# Patient Record
Sex: FEMALE | Race: WHITE
[De-identification: names, ages, dates, MRNs, and addresses within clinical notes are randomized per-mention and may not be internally consistent; named-entity substitution may affect disease eponyms.]

---

## 2018-05-04 ENCOUNTER — RESULT REVIEW (OUTPATIENT)
Age: 53
End: 2018-05-04

## 2019-04-19 ENCOUNTER — RX RENEWAL (OUTPATIENT)
Age: 54
End: 2019-04-19

## 2019-04-19 PROBLEM — Z00.00 ENCOUNTER FOR PREVENTIVE HEALTH EXAMINATION: Status: ACTIVE | Noted: 2019-04-19

## 2019-05-03 ENCOUNTER — RECORD ABSTRACTING (OUTPATIENT)
Age: 54
End: 2019-05-03

## 2019-05-03 DIAGNOSIS — Z78.9 OTHER SPECIFIED HEALTH STATUS: ICD-10-CM

## 2019-05-03 DIAGNOSIS — K42.0 UMBILICAL HERNIA WITH OBSTRUCTION, W/OUT GANGRENE: ICD-10-CM

## 2019-05-03 DIAGNOSIS — Z86.19 PERSONAL HISTORY OF OTHER INFECTIOUS AND PARASITIC DISEASES: ICD-10-CM

## 2019-05-03 DIAGNOSIS — R23.2 FLUSHING: ICD-10-CM

## 2019-05-03 DIAGNOSIS — I49.9 CARDIAC ARRHYTHMIA, UNSPECIFIED: ICD-10-CM

## 2019-05-03 DIAGNOSIS — N91.2 AMENORRHEA, UNSPECIFIED: ICD-10-CM

## 2019-05-03 DIAGNOSIS — Z82.49 FAMILY HISTORY OF ISCHEMIC HEART DISEASE AND OTHER DISEASES OF THE CIRCULATORY SYSTEM: ICD-10-CM

## 2019-05-03 LAB
CYTOLOGY CVX/VAG DOC THIN PREP: NORMAL
CYTOLOGY CVX/VAG DOC THIN PREP: NORMAL

## 2019-05-03 RX ORDER — MULTIVITAMIN
TABLET ORAL
Refills: 0 | Status: ACTIVE | COMMUNITY

## 2019-05-06 ENCOUNTER — APPOINTMENT (OUTPATIENT)
Dept: OBGYN | Facility: CLINIC | Age: 54
End: 2019-05-06
Payer: COMMERCIAL

## 2019-05-06 VITALS
SYSTOLIC BLOOD PRESSURE: 100 MMHG | BODY MASS INDEX: 24.72 KG/M2 | WEIGHT: 148.4 LBS | DIASTOLIC BLOOD PRESSURE: 60 MMHG | HEIGHT: 65 IN

## 2019-05-06 DIAGNOSIS — N89.8 OTHER SPECIFIED NONINFLAMMATORY DISORDERS OF VAGINA: ICD-10-CM

## 2019-05-06 DIAGNOSIS — N60.02 SOLITARY CYST OF LEFT BREAST: ICD-10-CM

## 2019-05-06 DIAGNOSIS — Z80.8 FAMILY HISTORY OF MALIGNANT NEOPLASM OF OTHER ORGANS OR SYSTEMS: ICD-10-CM

## 2019-05-06 PROCEDURE — 99396 PREV VISIT EST AGE 40-64: CPT

## 2019-05-06 PROCEDURE — 81003 URINALYSIS AUTO W/O SCOPE: CPT | Mod: QW

## 2019-05-06 PROCEDURE — 82270 OCCULT BLOOD FECES: CPT

## 2019-06-02 LAB
BILIRUB UR QL STRIP: NORMAL
CYTOLOGY CVX/VAG DOC THIN PREP: NORMAL
GLUCOSE UR-MCNC: NORMAL
HCG UR QL: 0.2 EU/DL
HGB UR QL STRIP.AUTO: NORMAL
HPV HIGH+LOW RISK DNA PNL CVX: NOT DETECTED
KETONES UR-MCNC: NORMAL
LEUKOCYTE ESTERASE UR QL STRIP: NORMAL
NITRITE UR QL STRIP: NORMAL
PH UR STRIP: 6
PROT UR STRIP-MCNC: NORMAL
SP GR UR STRIP: 1.01

## 2019-08-21 PROBLEM — N89.8 VAGINAL DRYNESS: Status: ACTIVE | Noted: 2019-05-03

## 2019-08-21 PROBLEM — Z80.8 FAMILY HISTORY OF MALIGNANT NEOPLASM OF SKIN: Status: ACTIVE | Noted: 2019-05-03

## 2019-08-21 NOTE — PHYSICAL EXAM
[Alert] : alert [Acute Distress] : no acute distress [Mass] : no breast mass [Nipple Discharge] : no nipple discharge [Axillary LAD] : no axillary lymphadenopathy [Soft] : soft [Tender] : non tender [Oriented x3] : oriented to person, place, and time [Depressed Mood] : not depressed [Labia Minora] : labia minora [Labia Majora] : labia major [Normal] : clitoris [No Bleeding] : there was no active vaginal bleeding [Uterine Adnexae] : were not tender and not enlarged [No Tenderness] : no rectal tenderness [Occult Blood] : occult blood test from digital rectal exam was negative

## 2019-08-21 NOTE — HISTORY OF PRESENT ILLNESS
[Last Mammogram ___] : Last Mammogram was [unfilled] [Last Pap ___] : Last cervical pap smear was [unfilled] [Definite:  ___ (Date)] : the last menstrual period was [unfilled] [Menarche Age: ____] : age at menarche was [unfilled] [Sexually Active] : is sexually active [Male ___] : [unfilled] male [de-identified] : Breast u/s 04/18/2018

## 2020-04-06 ENCOUNTER — RX RENEWAL (OUTPATIENT)
Age: 55
End: 2020-04-06

## 2020-04-07 ENCOUNTER — RX RENEWAL (OUTPATIENT)
Age: 55
End: 2020-04-07

## 2020-07-01 ENCOUNTER — RX RENEWAL (OUTPATIENT)
Age: 55
End: 2020-07-01

## 2020-10-05 ENCOUNTER — APPOINTMENT (OUTPATIENT)
Dept: OBGYN | Facility: CLINIC | Age: 55
End: 2020-10-05
Payer: COMMERCIAL

## 2020-10-05 VITALS
HEIGHT: 65 IN | WEIGHT: 148 LBS | DIASTOLIC BLOOD PRESSURE: 70 MMHG | BODY MASS INDEX: 24.66 KG/M2 | SYSTOLIC BLOOD PRESSURE: 120 MMHG

## 2020-10-05 PROCEDURE — 99396 PREV VISIT EST AGE 40-64: CPT

## 2020-10-05 PROCEDURE — 81003 URINALYSIS AUTO W/O SCOPE: CPT | Mod: QW

## 2020-10-05 PROCEDURE — 82270 OCCULT BLOOD FECES: CPT

## 2020-10-05 RX ORDER — OMEGA-3/DHA/EPA/FISH OIL 300-1000MG
1000 CAPSULE ORAL
Refills: 0 | Status: COMPLETED | COMMUNITY
End: 2020-10-05

## 2020-10-18 NOTE — REVIEW OF SYSTEMS
[Skin Rash] : skin rash [Negative] : Heme/Lymph [de-identified] : at the end of the dosing interval for her larger E2 patch

## 2020-10-18 NOTE — PHYSICAL EXAM
[Appropriately responsive] : appropriately responsive [Alert] : alert [No Acute Distress] : no acute distress [No Lymphadenopathy] : no lymphadenopathy [Regular Rate Rhythm] : regular rate rhythm [No Murmurs] : no murmurs [Clear to Auscultation B/L] : clear to auscultation bilaterally [Soft] : soft [Non-tender] : non-tender [Non-distended] : non-distended [No HSM] : No HSM [No Lesions] : no lesions [No Mass] : no mass [Oriented x3] : oriented x3 [Examination Of The Breasts] : a normal appearance [No Masses] : no breast masses were palpable [Labia Majora] : normal [Labia Minora] : normal [Normal] : normal [Uterine Adnexae] : normal [No Tenderness] : no tenderness [Nl Sphincter Tone] : normal sphincter tone [FreeTextEntry9] : Hemoccult negative

## 2020-10-18 NOTE — HISTORY OF PRESENT ILLNESS
[Patient reported mammogram was normal] : Patient reported mammogram was normal [Patient reported PAP Smear was normal] : Patient reported PAP Smear was normal [Y] : Patient is sexually active [Hot Flashes] : hot flashes [FreeTextEntry1] : Saray is here for an annual exam and she is doing well. She is having adhesive reactions to the bigger estradiol patches.  [Mammogramdate] : 10/22/2019 [TextBox_19] : BR1 [PapSmeardate] : 05/06/2019 [TextBox_31] : neg [PGHxTotal] : 4 [TextBox_9] : 12

## 2020-11-10 LAB
CYTOLOGY CVX/VAG DOC THIN PREP: NORMAL
DATE COLLECTED: NORMAL
HEMOCCULT SP1 STL QL: NEGATIVE
HPV HIGH+LOW RISK DNA PNL CVX: NOT DETECTED
QUALITY CONTROL: YES

## 2021-08-07 NOTE — PLAN
[FreeTextEntry1] : During this visit comprehensive counseling was given regarding the following concerns:\par \par 1 We discussed the need for proper nutrition and exercise.  This includes cardiovascular and pelvic floor exercise.\par \par 2 We discussed the importance of maintaining a proper vaccination schedule and we discussed the utility of the flu vaccine and TDaP. She had a flu vaccine at work. \par \par 3 We discussed the impact of chronic sun exposure and the risk for skin disease as a result. The benefits of a total body scan by a Dermatologist was reviewed..\par \par 4 We discussed the need for certain supplements for most people which include vitamin D3, calcium rich foods with limitation on calcium supplementation by tabular form to 600        mg by mouth daily.  As part of a bone health program we recommend weightbearing exercises, and some limited sun exposure ( 10-15 minutes daily) to help convert vitamin D to its active form.\par  We discussd the issues and we will try the Vivelle dot as it is changed twice weekly and has a smaller surface area. She will go for her breast imaging as planned. COVID-19  2020

## 2021-08-16 RX ORDER — ESTRADIOL AND LEVONORGESTREL .045; .015 MG/D; MG/D
0.045-0.015 PATCH TRANSDERMAL
Qty: 2 | Refills: 0 | Status: DISCONTINUED | COMMUNITY
Start: 2020-04-06 | End: 2021-08-16

## 2021-10-11 ENCOUNTER — NON-APPOINTMENT (OUTPATIENT)
Age: 56
End: 2021-10-11

## 2021-10-11 ENCOUNTER — APPOINTMENT (OUTPATIENT)
Dept: OBGYN | Facility: CLINIC | Age: 56
End: 2021-10-11
Payer: COMMERCIAL

## 2021-10-11 VITALS
HEIGHT: 65 IN | SYSTOLIC BLOOD PRESSURE: 100 MMHG | DIASTOLIC BLOOD PRESSURE: 58 MMHG | BODY MASS INDEX: 22.82 KG/M2 | WEIGHT: 137 LBS

## 2021-10-11 DIAGNOSIS — Z87.898 PERSONAL HISTORY OF OTHER SPECIFIED CONDITIONS: ICD-10-CM

## 2021-10-11 PROCEDURE — 99396 PREV VISIT EST AGE 40-64: CPT

## 2021-10-11 PROCEDURE — 82270 OCCULT BLOOD FECES: CPT

## 2021-10-11 NOTE — END OF VISIT
[FreeTextEntry3] : I, [Romain Almazan] solely acted as scribe for Dr. Yoan Moura on 10/11/2021.\par All medical entries made by the Scribe were at my, Dr. Moura’s, direction and personally\par dictated by me on 10/11/2021.  I have reviewed the chart and agree that the record\par accurately reflects my personal performance of the history, physical exam, assessment and plan. I\par have also personally directed, reviewed, and agreed with the chart.

## 2021-10-11 NOTE — DISCUSSION/SUMMARY
[FreeTextEntry1] : During this visit comprehensive counseling was given regarding the following concerns:\par 1 We discussed the need for proper nutrition and exercise. This includes cardiovascular and\par pelvic floor exercise.\par 2 We discussed the importance of maintaining a proper vaccination schedule and we discussed\par the utility of the flu vaccine and TDaP.\par 3 We discussed the impact of chronic sun exposure and the risk for skin disease as a result. The\par benefits of a total body scan by a Dermatologist was reviewed..\par 4 We discussed the need for certain supplements for most people which include vitamin D3,\par calcium rich foods with limitation on calcium supplementation by tabular form to 600 mg by\par mouth daily. As part of a bone health program we recommend weight bearing exercises, and\par some limited sun exposure (10-15 minutes daily) to help convert vitamin D to its active form.\par \par The benefits of screening colonoscopy were reviewed in detail. Referred to colorectal specialist\par for baseline colonoscopy.\par \par Prescriptions for mammogram screening, breast sonogram, and pelvic sonogram given.\par \par During this visit 20 minutes were spent face-to-face with greater than 50% of the time dedicated\par to counseling.\par

## 2021-10-11 NOTE — PHYSICAL EXAM
[Appropriately responsive] : appropriately responsive [Alert] : alert [No Acute Distress] : no acute distress [No Lymphadenopathy] : no lymphadenopathy [Regular Rate Rhythm] : regular rate rhythm [No Murmurs] : no murmurs [Clear to Auscultation B/L] : clear to auscultation bilaterally [Soft] : soft [Non-tender] : non-tender [Non-distended] : non-distended [No HSM] : No HSM [No Lesions] : no lesions [No Mass] : no mass [Oriented x3] : oriented x3 [Examination Of The Breasts] : a normal appearance [No Discharge] : no discharge [No Masses] : no breast masses were palpable [Labia Majora] : normal [Labia Minora] : normal [No Bleeding] : There was no active vaginal bleeding [Normal] : normal [Uterine Adnexae] : normal

## 2021-10-11 NOTE — HISTORY OF PRESENT ILLNESS
[Patient reported PAP Smear was normal] : Patient reported PAP Smear was normal [HPV test offered] : HPV test offered [N] : Patient reports normal menses [Vasectomy (partner)] : has a partner with a vasectomy [Y] : Positive pregnancy history [Menarche Age: ____] : age at menarche was [unfilled] [No] : Patient does not have concerns regarding sex [Men] : men [Currently Active] : currently active [Yes] : Yes [Mammogramdate] : 03/23/2021 [TextBox_4] : Pt presents for an Annual. [TextBox_19] : BR1 [PapSmeardate] : 10/05/2020 [HPVDate] : 10/05/2020 [TextBox_78] : Negative [LMPDate] : 12/18/2018 [PGHxTotal] : 4 [[facility] HospitallTerm] : 3 [[facility] Centeriving] : 3 [PGHxAbortions] : 1 [FreeTextEntry1] : 12/18/2018 [FreeTextEntry3] : Vasectomy

## 2021-10-27 ENCOUNTER — APPOINTMENT (OUTPATIENT)
Dept: OBGYN | Facility: CLINIC | Age: 56
End: 2021-10-27
Payer: COMMERCIAL

## 2021-10-27 ENCOUNTER — ASOB RESULT (OUTPATIENT)
Age: 56
End: 2021-10-27

## 2021-10-27 PROCEDURE — 76830 TRANSVAGINAL US NON-OB: CPT

## 2022-02-15 ENCOUNTER — NON-APPOINTMENT (OUTPATIENT)
Age: 57
End: 2022-02-15

## 2022-03-16 ENCOUNTER — APPOINTMENT (OUTPATIENT)
Dept: OBGYN | Facility: CLINIC | Age: 57
End: 2022-03-16
Payer: COMMERCIAL

## 2022-03-16 ENCOUNTER — APPOINTMENT (OUTPATIENT)
Dept: ANTEPARTUM | Facility: CLINIC | Age: 57
End: 2022-03-16
Payer: COMMERCIAL

## 2022-03-16 ENCOUNTER — ASOB RESULT (OUTPATIENT)
Age: 57
End: 2022-03-16

## 2022-03-16 ENCOUNTER — TRANSCRIPTION ENCOUNTER (OUTPATIENT)
Age: 57
End: 2022-03-16

## 2022-03-16 VITALS
WEIGHT: 138 LBS | SYSTOLIC BLOOD PRESSURE: 102 MMHG | DIASTOLIC BLOOD PRESSURE: 62 MMHG | HEIGHT: 65 IN | BODY MASS INDEX: 22.99 KG/M2

## 2022-03-16 PROCEDURE — 76830 TRANSVAGINAL US NON-OB: CPT

## 2022-03-16 PROCEDURE — 58100 BIOPSY OF UTERUS LINING: CPT

## 2022-03-16 PROCEDURE — 99214 OFFICE O/P EST MOD 30 MIN: CPT | Mod: 25

## 2022-03-17 NOTE — HISTORY OF PRESENT ILLNESS
[N] : Patient does not use contraception [Y] : Positive pregnancy history [Menarche Age: ____] : age at menarche was [unfilled] [No] : Patient does not have concerns regarding sex [Mammogramdate] : 02/09/22 [BreastSonogramDate] : 02/09/22 [PapSmeardate] : 10/11/21 [ColonoscopyDate] : 03/15/22 [HPVDate] : 10/11/21 [TextBox_78] : NEG [LMPDate] : 12/18/18 [PGHxTotal] : 4 [[facility] HospitallTerm] : 3 [[facility] Center at Surpriseiving] : 3 [PGHxABInduced] : 1 [FreeTextEntry1] : 12/18/18

## 2022-03-17 NOTE — PHYSICAL EXAM
[Chaperone Present] : A chaperone was present in the examining room during all aspects of the physical examination [Appropriately responsive] : appropriately responsive [Alert] : alert [No Acute Distress] : no acute distress [Oriented x3] : oriented x3 [Labia Majora] : normal [Labia Minora] : normal [No Bleeding] : There was no active vaginal bleeding [Normal] : normal [Uterine Adnexae] : normal [FreeTextEntry1] : MA: Renetta [FreeTextEntry5] : ectropion cervix noted

## 2022-03-17 NOTE — END OF VISIT
[FreeTextEntry3] : I, Darnellkeon Hoyos solely acted as scribe for Dr. Yoan Moura on 03/16/2022.\par All medical entries made by the Scribe were at my, Dr. Moura’s, direction and personally\par dictated by me on 03/16/2022.  I have reviewed the chart and agree that the record\par accurately reflects my personal performance of the history, physical exam, assessment and plan. I\par have also personally directed, reviewed, and agreed with the chart.

## 2022-03-17 NOTE — PROCEDURE
[Endometrial Biopsy] : Endometrial biopsy [Time out performed] : Pre-procedure time out performed.  Patient's name, date of birth and procedure confirmed. [Thickened Endometrium] : thickened endometrium [Post-Menop. Bleeding] : post-menopausal bleeding [Risks] : risks [Benefits] : benefits [Alternatives] : alternatives [Patient] : patient [Infection] : infection [Bleeding] : bleeding [Uterine Perforation] : uterine perforation [No Premedication] : No premedication [None] : none [Easy Passage] : Easy passage [Moderate] : moderate [Sent to Pathology] : placed in buffered formalin and sent for pathology [Tolerated Well] : Patient tolerated the procedure well [No Complications] : No complications [de-identified] : none

## 2022-03-17 NOTE — DISCUSSION/SUMMARY
[FreeTextEntry1] : Transvaginal sonogram results reviewed with patient. Results showed: a heterogenous uterus with the endometrial thickness measuring 8.1 mm. Nabothian cyst seen on cervix.\par \par We discussed the need for a endometrial sampling due to vaginal staining and endometrial thickness. \par Thus was performed easily and was well tolerated. Specimen sent to pathology. 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 \par Ibuprofen was provided \par \par During this visit 20 minutes were spent face-to-face with greater than 50% of the time dedicated\par to counseling.

## 2022-04-03 ENCOUNTER — TRANSCRIPTION ENCOUNTER (OUTPATIENT)
Age: 57
End: 2022-04-03

## 2022-04-03 LAB — CORE LAB BIOPSY: NORMAL

## 2022-04-05 ENCOUNTER — TRANSCRIPTION ENCOUNTER (OUTPATIENT)
Age: 57
End: 2022-04-05

## 2023-03-21 ENCOUNTER — TRANSCRIPTION ENCOUNTER (OUTPATIENT)
Age: 58
End: 2023-03-21

## 2023-04-12 ENCOUNTER — APPOINTMENT (OUTPATIENT)
Dept: OBGYN | Facility: CLINIC | Age: 58
End: 2023-04-12
Payer: COMMERCIAL

## 2023-04-12 VITALS
WEIGHT: 141 LBS | DIASTOLIC BLOOD PRESSURE: 66 MMHG | SYSTOLIC BLOOD PRESSURE: 111 MMHG | BODY MASS INDEX: 23.49 KG/M2 | HEIGHT: 65 IN

## 2023-04-12 DIAGNOSIS — N95.1 MENOPAUSAL AND FEMALE CLIMACTERIC STATES: ICD-10-CM

## 2023-04-12 DIAGNOSIS — N95.0 POSTMENOPAUSAL BLEEDING: ICD-10-CM

## 2023-04-12 DIAGNOSIS — N60.01 SOLITARY CYST OF RIGHT BREAST: ICD-10-CM

## 2023-04-12 DIAGNOSIS — Z86.19 PERSONAL HISTORY OF OTHER INFECTIOUS AND PARASITIC DISEASES: ICD-10-CM

## 2023-04-12 LAB
DATE COLLECTED: NORMAL
HEMOCCULT SP1 STL QL: NEGATIVE
QUALITY CONTROL: YES

## 2023-04-12 PROCEDURE — 19000 PUNCTURE ASPIR CYST BREAST: CPT

## 2023-04-12 PROCEDURE — 99396 PREV VISIT EST AGE 40-64: CPT

## 2023-04-12 PROCEDURE — 82270 OCCULT BLOOD FECES: CPT

## 2023-04-12 NOTE — PROCEDURE
[Cervical Pap Smear] : cervical Pap smear [Liquid Base] : liquid base [Endometrial Biopsy] : Endometrial biopsy [Time out performed] : Pre-procedure time out performed.  Patient's name, date of birth and procedure confirmed. [Consent Obtained] : Consent obtained [Post-Menop. Bleeding] : post-menopausal bleeding [Risks] : risks [Benefits] : benefits [Alternatives] : alternatives [Patient] : patient [Infection] : infection [Bleeding] : bleeding [Allergic Reaction] : allergic reaction [Uterine Perforation] : uterine perforation [Pain] : pain [Negative] : negative pregnancy test [Betadine] : Betadine [None] : none [Tenaculum] : Tenaculum [Anteverted] : anteverted [Scant] : scant [Specimen Collected] : collected [Sent to Pathology] : placed in buffered formalin and sent for pathology [Tolerated Well] : Patient tolerated the procedure well [No Complications] : No complications

## 2023-04-12 NOTE — HISTORY OF PRESENT ILLNESS
[Y] : Positive pregnancy history [Menarche Age: ____] : age at menarche was [unfilled] [Currently Active] : currently active [Mammogramdate] : 2/9/22 [TextBox_19] : BR2 [BreastSonogramDate] : 2/9/22 [TextBox_25] : LT BR2 [PapSmeardate] : 10/11/21 [TextBox_31] : NEG [ColonoscopyDate] : 3/15/22 [HPVDate] : 10/11/21 [TextBox_78] : NEG [LMPDate] : 12/18/18 [PGHxTotal] : 4 [[facility]allTerm] : 3 [[facility] Centeriving] : 3 [PGHxABInduced] : 1 [FreeTextEntry1] : 12/18/18

## 2023-04-12 NOTE — END OF VISIT
[FreeTextEntry3] : I, Crystal Javier solely acted as a scribe for Dr. Yoan Moura on 04/12/2023  All medical entries made by the scribe were at my, Dr. Moura's, direction and personally dictated by me on 04/12/2023. I have reviewed the chart and agree that the record accurately reflects my personal performance of the history, physical exam, assessment and plan. I have also personally directed, reviewed, and agreed with the chart.

## 2023-04-12 NOTE — DISCUSSION/SUMMARY
[FreeTextEntry1] : Pap smear collected.\par \par -Refill for Divagel and Progesterone electronically sent to pharmacy \par \par -Cyst/mass  on medial lateral quadrant of breast. No fluid found, referred to Dr. Lopez imaging and ultrasound guiled aspiration.  \par \par -Endometrial biopsy done, patient tolerated well. Minimal tissue retrieved after 2 passes\par \par -Prescription for mammogram and breast ultrasound given today. [facility] imaging [facility] map given. \par \par -During this visit comprehensive counseling was given regarding the following concerns:\par \par 1. We discussed the need for proper nutrition and exercise. This includes cardiovascular and pelvic floor exercises.\par \par 2. We discussed the importance of maintaining proper vaccination schedule and we discussed the utility of the HPV, Influenza, Shingles, and TDaP vaccines. \par \par 3. We discussed the impact of chronic sun exposure and the risk for skin disease as a result. The benefits of a total body scan by a Dermatologist was reviewed.\par \par 4. We discussed the need for certain supplements for most people, which include Vitamin D3 and calcium rich foods with limitation on calcium supplementation. As part of bone health program, we recommend weight bearing exercises and limited sun exposure (10-15 minutes daily) to help convert Vitamin D to its active form.\par \par All questions and concerns were addressed at today's visit.

## 2023-04-12 NOTE — PHYSICAL EXAM
[Chaperone Present] : A chaperone was present in the examining room during all aspects of the physical examination [Appropriately responsive] : appropriately responsive [Alert] : alert [No Acute Distress] : no acute distress [No Lymphadenopathy] : no lymphadenopathy [Soft] : soft [Non-tender] : non-tender [No Lesions] : no lesions [No Mass] : no mass [Oriented x3] : oriented x3 [Examination Of The Breasts] : a normal appearance [No Discharge] : no discharge [Labia Majora] : normal [Labia Minora] : normal [Uterine Adnexae] : normal [Normal rectal exam] : was normal [Normal Brown Stool] : was normal and brown [Normal] : was normal [None] : there was no rectal mass  [FreeTextEntry1] : Liat Hughes [___cm] : a ~M [unfilled] ~Ucm superior medial quadrant mass was palpated [Occult Blood Positive] : was negative for occult blood analysis [Internal Hemorrhoid] : no internal hemorrhoids were present [External Hemorrhoid] : no external hemorrhoids were present [Skin Tags] : no residual hemorrhoidal skin tags 4

## 2023-04-15 LAB
CYTOLOGY CVX/VAG DOC THIN PREP: NORMAL
HPV HIGH+LOW RISK DNA PNL CVX: NOT DETECTED

## 2023-04-24 ENCOUNTER — NON-APPOINTMENT (OUTPATIENT)
Age: 58
End: 2023-04-24

## 2023-05-22 ENCOUNTER — NON-APPOINTMENT (OUTPATIENT)
Age: 58
End: 2023-05-22

## 2023-05-22 ENCOUNTER — TRANSCRIPTION ENCOUNTER (OUTPATIENT)
Age: 58
End: 2023-05-22

## 2024-04-10 ENCOUNTER — APPOINTMENT (OUTPATIENT)
Dept: OBGYN | Facility: CLINIC | Age: 59
End: 2024-04-10
Payer: COMMERCIAL

## 2024-04-10 VITALS
WEIGHT: 147 LBS | HEIGHT: 65 IN | DIASTOLIC BLOOD PRESSURE: 69 MMHG | BODY MASS INDEX: 24.49 KG/M2 | SYSTOLIC BLOOD PRESSURE: 111 MMHG

## 2024-04-10 DIAGNOSIS — Z12.4 ENCOUNTER FOR SCREENING FOR MALIGNANT NEOPLASM OF CERVIX: ICD-10-CM

## 2024-04-10 DIAGNOSIS — Z13.820 ENCOUNTER FOR SCREENING FOR OSTEOPOROSIS: ICD-10-CM

## 2024-04-10 DIAGNOSIS — N60.19 DIFFUSE CYSTIC MASTOPATHY OF UNSPECIFIED BREAST: ICD-10-CM

## 2024-04-10 DIAGNOSIS — Z12.12 ENCOUNTER FOR SCREENING FOR MALIGNANT NEOPLASM OF RECTUM: ICD-10-CM

## 2024-04-10 DIAGNOSIS — Z78.0 ASYMPTOMATIC MENOPAUSAL STATE: ICD-10-CM

## 2024-04-10 DIAGNOSIS — C43.9 MALIGNANT MELANOMA OF SKIN, UNSPECIFIED: ICD-10-CM

## 2024-04-10 DIAGNOSIS — Z12.39 ENCOUNTER FOR OTHER SCREENING FOR MALIGNANT NEOPLASM OF BREAST: ICD-10-CM

## 2024-04-10 PROCEDURE — 82270 OCCULT BLOOD FECES: CPT

## 2024-04-10 PROCEDURE — 99459 PELVIC EXAMINATION: CPT

## 2024-04-10 PROCEDURE — 99396 PREV VISIT EST AGE 40-64: CPT

## 2024-04-10 RX ORDER — CHROMIUM 200 MCG
TABLET ORAL
Refills: 0 | Status: ACTIVE | COMMUNITY

## 2024-04-10 RX ORDER — EUCALYPTUS OIL/MENTHOL/CAMPHOR 1.2%-4.8%
OINTMENT (GRAM) TOPICAL
Refills: 0 | Status: ACTIVE | COMMUNITY

## 2024-04-24 RX ORDER — ESTRADIOL 1 MG/G
1 GEL TOPICAL
Qty: 3 | Refills: 3 | Status: DISCONTINUED | COMMUNITY
Start: 2019-04-19 | End: 2024-04-24

## 2024-04-24 RX ORDER — PROGESTERONE 100 MG/1
100 CAPSULE ORAL
Qty: 90 | Refills: 2 | Status: COMPLETED | COMMUNITY
Start: 2020-10-05 | End: 2024-04-24

## 2024-04-29 LAB
CORE LAB BIOPSY: NORMAL
CYTOLOGY CVX/VAG DOC THIN PREP: NORMAL
DATE COLLECTED: NORMAL
HEMOCCULT SP1 STL QL: NEGATIVE
HPV HIGH+LOW RISK DNA PNL CVX: NOT DETECTED
QUALITY CONTROL: YES

## 2024-04-29 RX ORDER — ESTRADIOL 0.1 MG/D
0.1 PATCH, EXTENDED RELEASE TRANSDERMAL
Qty: 3 | Refills: 3 | Status: ACTIVE | COMMUNITY
Start: 2020-10-05

## 2024-04-29 RX ORDER — PROGESTERONE 100 MG/1
100 CAPSULE ORAL
Qty: 90 | Refills: 3 | Status: ACTIVE | COMMUNITY
Start: 2021-08-16

## 2024-04-29 NOTE — DISCUSSION/SUMMARY
[FreeTextEntry1] : -Annual gynecology exam- 1) Pap smear collected. 2) Benign rectal exam. Hemoccult negative. 3) Prescription for mammogram screening, breast ultrasound, and DEXA studies ordered. 4) RX renewed. Estradiol patches. Micronized progesterone   -During this visit comprehensive counseling was given regarding the following concerns: 1. We discussed the need for proper nutrition and exercise. This includes cardiovascular and pelvic floor exercises. 2. We discussed the importance of maintaining proper vaccination schedule and we discussed the utility of the HPV, Influenza, Shingles, and TDaP vaccines. 3. We discussed the impact of chronic sun exposure and the risk for skin disease as a result. The benefits of a total body scan by a Dermatologist was reviewed. 4. We discussed the need for certain supplements for most people, which include Vitamin D3 (2000 IU) and calcium rich foods with limitation on calcium supplementation by tabular form to 600 MG by mouth daily. As part of bone health program, we recommend weight bearing exercises and limited sun exposure (10-15 minutes daily) to help convert Vitamin D to its active form. 5. We discussed the benefit of pelvic floor exercises. She was instructed on the proper technique for contraction of the pelvic floor muscles and she was encouraged to perform this 20- 30 times daily as part of a general exercise program.   -She will follow up in 1 year or as needed. All questions and concerns were addressed.

## 2024-04-29 NOTE — END OF VISIT
[FreeTextEntry3] : I, Maribell Green solely acted as a scribe for Dr. Yoan Moura on 04/10/2024 . All medical entries made by the scribe were at my, Dr. Moura's, direction and personally dictated by me on 04/10/2024 . I have reviewed the chart and agree that the record accurately reflects my personal performance of the history, physical exam, assessment and plan. I have also personally directed, reviewed, and agreed with the chart.

## 2024-04-29 NOTE — HISTORY OF PRESENT ILLNESS
[Vasectomy (partner)] : has a partner with a vasectomy [Y] : Positive pregnancy history [Menarche Age: ____] : age at menarche was [unfilled] [Currently Active] : currently active [Men] : men [Mammogramdate] : 06/01/2023 [TextBox_19] : BR2 [BreastSonogramDate] : 06/01/2023 [TextBox_25] : BR2 [PapSmeardate] : 04/12/2023 [TextBox_31] : NORMAL [ColonoscopyDate] : 03/15/2022 [HPVDate] : 04/12/2023 [TextBox_78] : NEG [LMPDate] : 12/18/2018 [[facility]xSol[facility] Health CenterlTerm] : 3 [PGHxTotal] : 4 [[facility] Centeriving] : 3 [PGHxABInduced] : 1 [FreeTextEntry1] : 12/18/18

## 2024-04-29 NOTE — PHYSICAL EXAM
[Chaperone Present] : A chaperone was present in the examining room during all aspects of the physical examination [Appropriately responsive] : appropriately responsive [Alert] : alert [No Acute Distress] : no acute distress [No Lymphadenopathy] : no lymphadenopathy [Soft] : soft [Non-tender] : non-tender [Non-distended] : non-distended [No HSM] : No HSM [No Lesions] : no lesions [No Mass] : no mass [Oriented x3] : oriented x3 [Examination Of The Breasts] : a normal appearance [No Masses] : no breast masses were palpable [Labia Majora] : normal [Labia Minora] : normal [Normal] : normal [Uterine Adnexae] : normal [Normal rectal exam] : was normal [Normal Brown Stool] : was normal and brown [04093] : A chaperone was present during the pelvic exam. [FreeTextEntry2] : Ivanna Stanford MA and Olive Flowers [Occult Blood Positive] : was negative for occult blood analysis

## 2024-09-04 DIAGNOSIS — R92.8 OTHER ABNORMAL AND INCONCLUSIVE FINDINGS ON DIAGNOSTIC IMAGING OF BREAST: ICD-10-CM

## 2024-11-25 DIAGNOSIS — Z23 ENCOUNTER FOR IMMUNIZATION: ICD-10-CM

## 2025-04-15 ENCOUNTER — NON-APPOINTMENT (OUTPATIENT)
Age: 60
End: 2025-04-15

## 2025-04-16 ENCOUNTER — APPOINTMENT (OUTPATIENT)
Dept: ANTEPARTUM | Facility: CLINIC | Age: 60
End: 2025-04-16
Payer: COMMERCIAL

## 2025-04-16 ENCOUNTER — ASOB RESULT (OUTPATIENT)
Age: 60
End: 2025-04-16

## 2025-04-16 ENCOUNTER — APPOINTMENT (OUTPATIENT)
Dept: OBGYN | Facility: CLINIC | Age: 60
End: 2025-04-16

## 2025-04-16 VITALS
WEIGHT: 145 LBS | HEIGHT: 65 IN | SYSTOLIC BLOOD PRESSURE: 124 MMHG | DIASTOLIC BLOOD PRESSURE: 78 MMHG | BODY MASS INDEX: 24.16 KG/M2

## 2025-04-16 DIAGNOSIS — N95.0 POSTMENOPAUSAL BLEEDING: ICD-10-CM

## 2025-04-16 DIAGNOSIS — Z12.4 ENCOUNTER FOR SCREENING FOR MALIGNANT NEOPLASM OF CERVIX: ICD-10-CM

## 2025-04-16 DIAGNOSIS — Z12.12 ENCOUNTER FOR SCREENING FOR MALIGNANT NEOPLASM OF RECTUM: ICD-10-CM

## 2025-04-16 DIAGNOSIS — Z12.39 ENCOUNTER FOR OTHER SCREENING FOR MALIGNANT NEOPLASM OF BREAST: ICD-10-CM

## 2025-04-16 PROCEDURE — 99459 PELVIC EXAMINATION: CPT

## 2025-04-16 PROCEDURE — 76856 US EXAM PELVIC COMPLETE: CPT | Mod: 59

## 2025-04-16 PROCEDURE — 76830 TRANSVAGINAL US NON-OB: CPT

## 2025-04-16 PROCEDURE — 99396 PREV VISIT EST AGE 40-64: CPT

## 2025-04-16 PROCEDURE — 58100 BIOPSY OF UTERUS LINING: CPT

## 2025-04-18 ENCOUNTER — TRANSCRIPTION ENCOUNTER (OUTPATIENT)
Age: 60
End: 2025-04-18

## 2025-04-20 LAB
CYTOLOGY CVX/VAG DOC THIN PREP: ABNORMAL
HPV HIGH+LOW RISK DNA PNL CVX: NOT DETECTED

## 2025-04-23 ENCOUNTER — RX RENEWAL (OUTPATIENT)
Age: 60
End: 2025-04-23

## 2025-04-23 LAB — CORE LAB BIOPSY: NORMAL

## 2025-04-23 RX ORDER — PROGESTERONE 200 MG/1
200 CAPSULE ORAL
Qty: 90 | Refills: 1 | Status: ACTIVE | COMMUNITY
Start: 2025-04-23 | End: 1900-01-01

## 2025-05-01 ENCOUNTER — TRANSCRIPTION ENCOUNTER (OUTPATIENT)
Age: 60
End: 2025-05-01

## 2025-05-07 ENCOUNTER — RX RENEWAL (OUTPATIENT)
Age: 60
End: 2025-05-07

## 2025-07-15 ENCOUNTER — TRANSCRIPTION ENCOUNTER (OUTPATIENT)
Age: 60
End: 2025-07-15